# Patient Record
(demographics unavailable — no encounter records)

---

## 2025-04-15 NOTE — HISTORY OF PRESENT ILLNESS
[de-identified] : Patient Name: SEB GAITAN  MRN: [id number]  [place] MRN: Referring Provider: DIMITRI RIVAS MD  Date: 04/15/2025   Diagnosis: pancreas lesion  64 year old female presents for evaluation of a pancreatic mass. She has a PMH of Hepatitis B but declined antiviral treatment. Mother  of GB cancer, at age 70. Father  of breast cancer, at age 92 Patient reported left lower back discomfort and was referred for an ultrasound for further evaluation. 25 - US abdomen revealed a 1.8 cm hypoechoic lesion pancreatic head distribution. Hepatic and left renal cyst. 25 - CT CAP showed a new small cystic lesion in the head of pancreas, 0.7 x 1.5 cm pancreas head fluid density lesion. Likely a small branch IPMN.   Currently, Ms. GAITAN denies abdominal pain or discomfort. Her LLQ back pain is on/off and worse if she sits, she doesn't take any pain relief medication and pain is relieved on its own. She denies a decreased appetite. She denies any changes in her bowel habits. She denies unintentional weight loss.

## 2025-04-15 NOTE — HISTORY OF PRESENT ILLNESS
[de-identified] : Patient Name: SEB GAITAN  MRN: [id number]  [place] MRN: Referring Provider: DIMITRI RIVAS MD  Date: 04/15/2025   Diagnosis: pancreas lesion  64 year old female presents for evaluation of a pancreatic mass. She has a PMH of Hepatitis B but declined antiviral treatment. Mother  of GB cancer, at age 70. Father  of breast cancer, at age 92 Patient reported left lower back discomfort and was referred for an ultrasound for further evaluation. 25 - US abdomen revealed a 1.8 cm hypoechoic lesion pancreatic head distribution. Hepatic and left renal cyst. 25 - CT CAP showed a new small cystic lesion in the head of pancreas, 0.7 x 1.5 cm pancreas head fluid density lesion. Likely a small branch IPMN.   Currently, Ms. GAITAN denies abdominal pain or discomfort. Her LLQ back pain is on/off and worse if she sits, she doesn't take any pain relief medication and pain is relieved on its own. She denies a decreased appetite. She denies any changes in her bowel habits. She denies unintentional weight loss.

## 2025-04-15 NOTE — PHYSICAL EXAM
[Normal] : oriented to person, place and time, with appropriate affect [de-identified] : anicteric [de-identified] : S1,S2, regular rate and rhythm. No murmurs heard. [de-identified] : Clear throughout. No wheezes heard. [de-identified] : warm and dry

## 2025-04-15 NOTE — PHYSICAL EXAM
[Normal] : oriented to person, place and time, with appropriate affect [de-identified] : anicteric [de-identified] : S1,S2, regular rate and rhythm. No murmurs heard. [de-identified] : Clear throughout. No wheezes heard. [de-identified] : warm and dry

## 2025-04-15 NOTE — ASSESSMENT
[FreeTextEntry1] : Mr. Trujillo is a 64 y.o. woman with a 1.5-cm cyst of the pancreatic head. This is consistent with IPMN. The cyst does not feature neither high risk stigmata (jaundice, mural nodule  5 mm, main duct  10 mm), nor worrisome signs (new onset diabetes, mural nodules, enhancing cyst walls, change in caliber of pancreatic duct, significant cyst growth), and is therefore at very low risk for malignancy or malignant transformation - about 0-2% at 5 years (Chris S, et al. Fuk[place] criteria accurately predict risk for adverse outcomes during follow-up of pancreatic cysts presumed to be intraductal papillary mucinous neoplasms. Gut 2017;66:7772-9468). In these settings watchful follow up is appropriate.   As per 2022 [place] guidelines, we will repeat a CT pancreas in 6 months, then CT/MRI every 18 months, if stable.  I will discuss with Dr. Paige.

## 2025-04-15 NOTE — ASSESSMENT
[FreeTextEntry1] : Mr. Trujillo is a 64 y.o. woman with a 1.5-cm cyst of the pancreatic head. This is consistent with IPMN. The cyst does not feature neither high risk stigmata (jaundice, mural nodule  5 mm, main duct  10 mm), nor worrisome signs (new onset diabetes, mural nodules, enhancing cyst walls, change in caliber of pancreatic duct, significant cyst growth), and is therefore at very low risk for malignancy or malignant transformation - about 0-2% at 5 years (Chris S, et al. Fuk[place] criteria accurately predict risk for adverse outcomes during follow-up of pancreatic cysts presumed to be intraductal papillary mucinous neoplasms. Gut 2017;66:6847-8079). In these settings watchful follow up is appropriate.   As per 2022 [place] guidelines, we will repeat a CT pancreas in 6 months, then CT/MRI every 18 months, if stable.  I will discuss with Dr. Paige.

## 2025-06-23 NOTE — HISTORY OF PRESENT ILLNESS
[FreeTextEntry1] : RFR: Hep B Referring MD: PCP Dr. Laura Kay  Sherie Trujillo is a 65y female with Chronic Hep B (Dx in her 30s? , treatment naive) and Hepatic steatosis who is referred by PCP for management. Pt also has pancreatic lesion, HLD.   Pt does not take any prescribed medications.  Today she reports feeling well and no physical complaitns.   [Medical Hx] as above [Surgical Hx] None [Social Hx] Alcohol: rarely               Tobacco: Never     illicit drug: Denies           Herb and dietary Supplement: Vit D,C Multi vit [FMH of liver disease]  Mother ? "not sure" gallbladder cancer.  Brother - Hep B, not on med, no cirrhosis, no liver cancer  [Labs] 4/11/25 AFP 4.1 LFT wnl PLT>200 eAg- eAb+ HBV PCR not available  [Imaging] CT abd 4/13/25 LUNGS AND LARGE AIRWAYS: Patent central airways. No pulmonary nodules. PLEURA: No pleural effusion. VESSELS: Within normal limits. HEART: Heart size is normal. No pericardial effusion. MEDIASTINUM AND CONCEPCIÓN: No lymphadenopathy. CHEST WALL AND LOWER NECK: Within normal limits. LIVER: Scattered, varied-sized nonenhancing hypodensities. Those greater than 1 cm are compatible with cysts. Those less than 1 cm are likely cysts or biliary hamartomas. 2 small to definitely characterize. BILE DUCTS: Normal caliber. GALLBLADDER: Within normal limits. SPLEEN: Within normal limits. PANCREAS: A new, well delineated, 0.7 x 1.5 cm pancreatic head fluid density lesion is noted (2:49). It is adjacent to the pancreatic duct which is not distended. ADRENALS: Within normal limits. KIDNEYS/URETERS: Within normal limits. BLADDER: Within normal limits. REPRODUCTIVE ORGANS: Multiple uterine leiomyomata, many of which are calcified BOWEL: No bowel obstruction. Appendix is normal. PERITONEUM/RETROPERITONEUM: Within normal limits. VESSELS: Scattered atherosclerotic calcifications LYMPH NODES: No lymphadenopathy. ABDOMINAL WALL: Within normal limits. BONES: Mild degenerative changes IMPRESSION: A new small cystic lesion in head of pancreas as described above. Likely a small side branch type IPMN (intraductal papillary mucinous neoplasm). Recommend repeat study either with pancreatic protocol CT or MRI in 6 months for assessment of stability.

## 2025-06-23 NOTE — PHYSICAL EXAM
[Scleral Icterus] : No Scleral Icterus [Spider Angioma] : No spider angioma(s) were observed [Abdominal  Ascites] : no ascites [Splenomegaly] : no splenomegaly [Liver Palpable ___ Finger Breadths Below Costal Margin] : Liver edge was palpable [unfilled] finger breadths below costal margin [Non-Tender] : non-tender [Asterixis] : no asterixis observed [Jaundice] : No jaundice [Palmar Erythema] : no Palmar Erythema [General Appearance - Alert] : alert [General Appearance - In No Acute Distress] : in no acute distress [Sclera] : the sclera and conjunctiva were normal [Exaggerated Use Of Accessory Muscles For Inspiration] : no accessory muscle use [Heart Sounds] : normal S1 and S2 [Murmurs] : no murmurs [Abdomen Tenderness] : non-tender [Abdomen Hernia] : no hernia was discovered [No CVA Tenderness] : no ~M costovertebral angle tenderness [Abnormal Walk] : normal gait [Skin Color & Pigmentation] : normal skin color and pigmentation [Oriented To Time, Place, And Person] : oriented to person, place, and time

## 2025-06-23 NOTE — REVIEW OF SYSTEMS
[Fever] : no fever [Chills] : no chills [Feeling Tired] : not feeling tired [Eye Pain] : no eye pain [Earache] : no earache [Chest Pain] : no chest pain [Cough] : no cough [SOB on Exertion] : no shortness of breath during exertion [Abdominal Pain] : no abdominal pain [Vomiting] : no vomiting [Constipation] : no constipation [Diarrhea] : no diarrhea [Limb Swelling] : no limb swelling [Itching] : no itching [Dizziness] : no dizziness [Fainting] : no fainting [Anxiety] : no anxiety [Easy Bleeding] : no tendency for easy bleeding [Easy Bruising] : no tendency for easy bruising

## 2025-06-23 NOTE — ASSESSMENT
[FreeTextEntry1] :  [Assessment]     65y female with Chronic Hep B (Dx in her 30s?, treatment naive) and Hepatic steatosis  [Plan]  # Chronic Hep B # treatment naive No cirrhosis ALT <25 Will check HBV PCR - Education and counseling were provided to the patient. - Discussed at length with the patient that next course of action would depend on results   # Liver lesion too small to characterize AFP wnl  Labs 12/2025 MRI abd 12/2025 to [facility] Fibrosc[facility] & RTO in 12/2025.